# Patient Record
(demographics unavailable — no encounter records)

---

## 2024-10-29 NOTE — CONSULT LETTER
[Dear  ___] : Dear  [unfilled], [Please see my note below.] : Please see my note below. [Sincerely,] : Sincerely, [FreeTextEntry1] : This is an update on SUKHWINDER KENDRICK  who I saw in the office today for a follow up. This is continuing active treatment of an existing pt. [FreeTextEntry3] : Dr Rodríguez

## 2024-10-29 NOTE — HISTORY OF PRESENT ILLNESS
[FreeTextEntry1] : 2.5 year old male last seen on 4/30/2024 with recurrent GTC seizures since 1 year old. No fever. First seizure was 2 minutes long, seen at hospital in [facility] and treated with phenobarbital. Second seizure 3 months later, had EEG testing at hospital and meds switched to carbamazepine. After 4 months of treatment, mom stopped the meds on her own. Moved to [place] in September 2023. Had 2 more seizures, the most recent one of longer duration (10 minutes). This was in December 2023. Pt admitted to [facility], had EEG testing and sent home on Keppra 1 mL bid. No further seizures so far. Walked at 1 year old. Speaks in small sentences. Good eye contact and receptive skills. FMH -ve for epilepsy. Birth: FTNSVD no complications. PMH otherwise -ve. NKA. Yemeni  used (Anaid, ID#: [id number]). MRI brain and EEG were NL (January 2024). Mom needs clearance for pt to undergo circumcision (note given).

## 2024-10-29 NOTE — PHYSICAL EXAM
[FreeTextEntry1] : Alert, NAD. HC 48.5 cm. Heart sounds NL. Neck FROM. Abdomen soft, no masses. PERRL, EOMI, face symmetric, hearing intact. Tone, power, gait, DTRs NL. No nystagmus or tremor.

## 2024-10-29 NOTE — DISCUSSION/SUMMARY
[FreeTextEntry1] : Epilepsy controlled on Keppra. Will adjust Keppra dose to 1.3 mL bid to keep it at a minimum seizure-protective amount of 20 mg/kg/day, now that pt weighs 13 kg.  Will get EEG. Rx written for chloral hydrate  1300 mg with 1 refill. RTO in 6 months. If seizure-free by 2027 we may taper off Keppra at that time. Note sent to Dr Coreas(PCP). Total clinician time spent on 10/29/2024 is 35 minutes including preparing to see the patient, obtaining and/or reviewing and confirming history, performing a medically necessary and appropriate examination, counseling and educating the patient and/or family, documenting clinical information in the EHR and communicating and/or referring to other healthcare professionals.

## 2025-04-29 NOTE — DISCUSSION/SUMMARY
[FreeTextEntry1] : Epilepsy controlled on Keppra. Continue Keppra 1.3 mL bid. RTO in 6 months. If seizure-free by January 2028 we may taper off Keppra at that time. Note sent to Dr Coreas(PCP). Total clinician time spent on 4/29/2025 is 34 minutes including preparing to see the patient, obtaining and/or reviewing and confirming history, performing a medically necessary and appropriate examination, counseling and educating the patient and/or family, documenting clinical information in the EHR and communicating and/or referring to other healthcare professionals.

## 2025-04-29 NOTE — PHYSICAL EXAM
[FreeTextEntry1] : Alert, NAD. Heart sounds NL. Neck FROM. PERRL, EOMI, face symmetric, hearing intact. Tone, power, gait, DTRs NL. No nystagmus or tremor.

## 2025-07-29 NOTE — DISCUSSION/SUMMARY
[FreeTextEntry1] : Epilepsy controlled on Keppra. Adjust Keppra to 1.5 mL bid. RTO in 6 months. If seizure-free by July 2027 we may taper off Keppra at that time. Note sent to Dr Coreas(PCP). Total clinician time spent on 7/29/2025 is 34 minutes including preparing to see the patient, obtaining and/or reviewing and confirming history, performing a medically necessary and appropriate examination, counseling and educating the patient and/or family, documenting clinical information in the EHR and communicating and/or referring to other healthcare professionals.

## 2025-07-29 NOTE — HISTORY OF PRESENT ILLNESS
[FreeTextEntry1] : 3 year old male last seen on 4/29/25 with recurrent GTC seizures since 1 year old. No fever. First seizure was 2 minutes long, seen at hospital in [facility] and treated with phenobarbital. Second seizure 3 months later, had EEG testing at hospital and meds switched to carbamazepine. After 4 months of treatment, mom stopped the meds on her own. Moved to [place] in September 2023. Had 2 more seizures, the most recent one of longer duration (10 minutes). This was in December 2023. Pt admitted to [facility], had EEG testing and sent home on Keppra 1 mL bid. Dose adjusted in October 2024 to 1.3 mL bid (to maintain a 20 mg/kg/day dose - weight 13 kg). No further seizures since December 2023. Walked at 1 year old. Speaks in small sentences. Good eye contact and receptive skills. Doing well in pre-K. FMH -ve for epilepsy. Birth: FTNSVD no complications. PMH otherwise -ve. NKA. English  used (Far[employer]le[employer], ID#: [id number]).  MRI brain was NL (January 2024). Most recent EEG (12/12/24) was NL.
